# Patient Record
Sex: FEMALE | Race: OTHER | HISPANIC OR LATINO | URBAN - METROPOLITAN AREA
[De-identification: names, ages, dates, MRNs, and addresses within clinical notes are randomized per-mention and may not be internally consistent; named-entity substitution may affect disease eponyms.]

---

## 2019-10-15 VITALS
OXYGEN SATURATION: 99 % | RESPIRATION RATE: 18 BRPM | WEIGHT: 187.39 LBS | DIASTOLIC BLOOD PRESSURE: 78 MMHG | TEMPERATURE: 98 F | SYSTOLIC BLOOD PRESSURE: 163 MMHG | HEART RATE: 70 BPM

## 2019-10-15 NOTE — ED ADULT TRIAGE NOTE - OTHER COMPLAINTS
CXR shows lt pleural effusion / atelectasis with mild pulmonary venous congestion / mild cardiomegaly.

## 2019-10-16 ENCOUNTER — INPATIENT (INPATIENT)
Facility: HOSPITAL | Age: 84
LOS: 1 days | Discharge: HOME | DRG: 194 | End: 2019-10-18
Attending: INTERNAL MEDICINE | Admitting: INTERNAL MEDICINE
Payer: COMMERCIAL

## 2019-10-16 DIAGNOSIS — R79.89 OTHER SPECIFIED ABNORMAL FINDINGS OF BLOOD CHEMISTRY: ICD-10-CM

## 2019-10-16 DIAGNOSIS — D64.9 ANEMIA, UNSPECIFIED: ICD-10-CM

## 2019-10-16 DIAGNOSIS — J12.9 VIRAL PNEUMONIA, UNSPECIFIED: ICD-10-CM

## 2019-10-16 DIAGNOSIS — R63.8 OTHER SYMPTOMS AND SIGNS CONCERNING FOOD AND FLUID INTAKE: ICD-10-CM

## 2019-10-16 DIAGNOSIS — Z29.9 ENCOUNTER FOR PROPHYLACTIC MEASURES, UNSPECIFIED: ICD-10-CM

## 2019-10-16 DIAGNOSIS — I73.9 PERIPHERAL VASCULAR DISEASE, UNSPECIFIED: ICD-10-CM

## 2019-10-16 DIAGNOSIS — M10.9 GOUT, UNSPECIFIED: ICD-10-CM

## 2019-10-16 DIAGNOSIS — Z66 DO NOT RESUSCITATE: ICD-10-CM

## 2019-10-16 DIAGNOSIS — Z91.89 OTHER SPECIFIED PERSONAL RISK FACTORS, NOT ELSEWHERE CLASSIFIED: ICD-10-CM

## 2019-10-16 DIAGNOSIS — J90 PLEURAL EFFUSION, NOT ELSEWHERE CLASSIFIED: ICD-10-CM

## 2019-10-16 DIAGNOSIS — D50.9 IRON DEFICIENCY ANEMIA, UNSPECIFIED: ICD-10-CM

## 2019-10-16 DIAGNOSIS — I10 ESSENTIAL (PRIMARY) HYPERTENSION: ICD-10-CM

## 2019-10-16 LAB
ANION GAP SERPL CALC-SCNC: 11 MMOL/L — SIGNIFICANT CHANGE UP (ref 5–17)
BUN SERPL-MCNC: 55 MG/DL — HIGH (ref 7–23)
CALCIUM SERPL-MCNC: 10 MG/DL — SIGNIFICANT CHANGE UP (ref 8.4–10.5)
CHLORIDE SERPL-SCNC: 109 MMOL/L — HIGH (ref 96–108)
CO2 SERPL-SCNC: 18 MMOL/L — LOW (ref 22–31)
CREAT SERPL-MCNC: 1.35 MG/DL — HIGH (ref 0.5–1.3)
GLUCOSE SERPL-MCNC: 171 MG/DL — HIGH (ref 70–99)
LACTATE SERPL-SCNC: 0.8 MMOL/L — SIGNIFICANT CHANGE UP (ref 0.5–2)
MAGNESIUM SERPL-MCNC: 1.1 MG/DL — LOW (ref 1.6–2.6)
OSMOLALITY SERPL: 308 MOSMOL/KG — HIGH (ref 280–301)
PHOSPHATE SERPL-MCNC: 4.2 MG/DL — SIGNIFICANT CHANGE UP (ref 2.5–4.5)
POTASSIUM SERPL-MCNC: 5.6 MMOL/L — HIGH (ref 3.5–5.3)
POTASSIUM SERPL-SCNC: 5.6 MMOL/L — HIGH (ref 3.5–5.3)
PROCALCITONIN SERPL-MCNC: 0.2 NG/ML — HIGH (ref 0.02–0.1)
RAPID RVP RESULT: DETECTED
RV+EV RNA SPEC QL NAA+PROBE: DETECTED
SODIUM SERPL-SCNC: 138 MMOL/L — SIGNIFICANT CHANGE UP (ref 135–145)
TROPONIN T SERPL-MCNC: 0.02 NG/ML — HIGH (ref 0–0.01)
TROPONIN T SERPL-MCNC: 0.03 NG/ML — HIGH (ref 0–0.01)

## 2019-10-16 PROCEDURE — 93321 DOPPLER ECHO F-UP/LMTD STD: CPT | Mod: 26

## 2019-10-16 PROCEDURE — 71045 X-RAY EXAM CHEST 1 VIEW: CPT | Mod: 26

## 2019-10-16 PROCEDURE — 93010 ELECTROCARDIOGRAM REPORT: CPT

## 2019-10-16 PROCEDURE — 93308 TTE F-UP OR LMTD: CPT | Mod: 26

## 2019-10-16 PROCEDURE — 99223 1ST HOSP IP/OBS HIGH 75: CPT | Mod: GC

## 2019-10-16 PROCEDURE — 99053 MED SERV 10PM-8AM 24 HR FAC: CPT

## 2019-10-16 PROCEDURE — 73562 X-RAY EXAM OF KNEE 3: CPT | Mod: 26,50

## 2019-10-16 PROCEDURE — 99285 EMERGENCY DEPT VISIT HI MDM: CPT | Mod: 25

## 2019-10-16 RX ORDER — ATORVASTATIN CALCIUM 80 MG/1
1 TABLET, FILM COATED ORAL
Qty: 0 | Refills: 0 | DISCHARGE

## 2019-10-16 RX ORDER — NIMODIPINE 60 MG/10ML
30 SOLUTION ORAL EVERY 6 HOURS
Refills: 0 | Status: DISCONTINUED | OUTPATIENT
Start: 2019-10-16 | End: 2019-10-16

## 2019-10-16 RX ORDER — ASPIRIN/CALCIUM CARB/MAGNESIUM 324 MG
81 TABLET ORAL DAILY
Refills: 0 | Status: DISCONTINUED | OUTPATIENT
Start: 2019-10-16 | End: 2019-10-16

## 2019-10-16 RX ORDER — INFLUENZA VIRUS VACCINE 15; 15; 15; 15 UG/.5ML; UG/.5ML; UG/.5ML; UG/.5ML
0.5 SUSPENSION INTRAMUSCULAR ONCE
Refills: 0 | Status: DISCONTINUED | OUTPATIENT
Start: 2019-10-16 | End: 2019-10-18

## 2019-10-16 RX ORDER — IPRATROPIUM/ALBUTEROL SULFATE 18-103MCG
3 AEROSOL WITH ADAPTER (GRAM) INHALATION EVERY 6 HOURS
Refills: 0 | Status: DISCONTINUED | OUTPATIENT
Start: 2019-10-16 | End: 2019-10-18

## 2019-10-16 RX ORDER — PENTOXIFYLLINE 400 MG
400 TABLET, EXTENDED RELEASE ORAL DAILY
Refills: 0 | Status: DISCONTINUED | OUTPATIENT
Start: 2019-10-16 | End: 2019-10-16

## 2019-10-16 RX ORDER — ALLOPURINOL 300 MG
50 TABLET ORAL DAILY
Refills: 0 | Status: DISCONTINUED | OUTPATIENT
Start: 2019-10-16 | End: 2019-10-18

## 2019-10-16 RX ORDER — FUROSEMIDE 40 MG
40 TABLET ORAL ONCE
Refills: 0 | Status: COMPLETED | OUTPATIENT
Start: 2019-10-16 | End: 2019-10-16

## 2019-10-16 RX ORDER — IRON,CARBONYL 45 MG
1 TABLET ORAL
Qty: 0 | Refills: 0 | DISCHARGE

## 2019-10-16 RX ORDER — IPRATROPIUM/ALBUTEROL SULFATE 18-103MCG
3 AEROSOL WITH ADAPTER (GRAM) INHALATION ONCE
Refills: 0 | Status: COMPLETED | OUTPATIENT
Start: 2019-10-16 | End: 2019-10-16

## 2019-10-16 RX ORDER — ATORVASTATIN CALCIUM 80 MG/1
20 TABLET, FILM COATED ORAL AT BEDTIME
Refills: 0 | Status: DISCONTINUED | OUTPATIENT
Start: 2019-10-16 | End: 2019-10-18

## 2019-10-16 RX ORDER — ACETYLCYSTEINE 200 MG/ML
5 VIAL (ML) MISCELLANEOUS THREE TIMES A DAY
Refills: 0 | Status: COMPLETED | OUTPATIENT
Start: 2019-10-16 | End: 2019-10-17

## 2019-10-16 RX ORDER — POLYETHYLENE GLYCOL 3350 17 G/17G
17 POWDER, FOR SOLUTION ORAL DAILY
Refills: 0 | Status: DISCONTINUED | OUTPATIENT
Start: 2019-10-16 | End: 2019-10-18

## 2019-10-16 RX ORDER — LOSARTAN POTASSIUM 100 MG/1
50 TABLET, FILM COATED ORAL DAILY
Refills: 0 | Status: DISCONTINUED | OUTPATIENT
Start: 2019-10-16 | End: 2019-10-16

## 2019-10-16 RX ORDER — AZITHROMYCIN 500 MG/1
500 TABLET, FILM COATED ORAL ONCE
Refills: 0 | Status: COMPLETED | OUTPATIENT
Start: 2019-10-16 | End: 2019-10-16

## 2019-10-16 RX ORDER — SENNA PLUS 8.6 MG/1
2 TABLET ORAL AT BEDTIME
Refills: 0 | Status: DISCONTINUED | OUTPATIENT
Start: 2019-10-16 | End: 2019-10-18

## 2019-10-16 RX ORDER — CEFTRIAXONE 500 MG/1
1000 INJECTION, POWDER, FOR SOLUTION INTRAMUSCULAR; INTRAVENOUS ONCE
Refills: 0 | Status: COMPLETED | OUTPATIENT
Start: 2019-10-16 | End: 2019-10-16

## 2019-10-16 RX ORDER — PENTOXIFYLLINE 400 MG
1 TABLET, EXTENDED RELEASE ORAL
Qty: 0 | Refills: 0 | DISCHARGE

## 2019-10-16 RX ORDER — FOLIC ACID 0.8 MG
1 TABLET ORAL DAILY
Refills: 0 | Status: DISCONTINUED | OUTPATIENT
Start: 2019-10-16 | End: 2019-10-18

## 2019-10-16 RX ORDER — FERROUS SULFATE 325(65) MG
325 TABLET ORAL DAILY
Refills: 0 | Status: DISCONTINUED | OUTPATIENT
Start: 2019-10-16 | End: 2019-10-18

## 2019-10-16 RX ORDER — MAGNESIUM SULFATE 500 MG/ML
4 VIAL (ML) INJECTION ONCE
Refills: 0 | Status: COMPLETED | OUTPATIENT
Start: 2019-10-16 | End: 2019-10-16

## 2019-10-16 RX ORDER — ALLOPURINOL 300 MG
0.5 TABLET ORAL
Qty: 0 | Refills: 0 | DISCHARGE

## 2019-10-16 RX ORDER — NIMODIPINE 60 MG/10ML
1 SOLUTION ORAL
Qty: 0 | Refills: 0 | DISCHARGE

## 2019-10-16 RX ORDER — ENOXAPARIN SODIUM 100 MG/ML
30 INJECTION SUBCUTANEOUS EVERY 24 HOURS
Refills: 0 | Status: DISCONTINUED | OUTPATIENT
Start: 2019-10-16 | End: 2019-10-18

## 2019-10-16 RX ORDER — AMLODIPINE BESYLATE 2.5 MG/1
5 TABLET ORAL DAILY
Refills: 0 | Status: DISCONTINUED | OUTPATIENT
Start: 2019-10-16 | End: 2019-10-16

## 2019-10-16 RX ORDER — LOSARTAN POTASSIUM 100 MG/1
1 TABLET, FILM COATED ORAL
Qty: 0 | Refills: 0 | DISCHARGE

## 2019-10-16 RX ORDER — CITICOLINE SODIUM 500 MG
0.5 TABLET ORAL
Qty: 0 | Refills: 0 | DISCHARGE

## 2019-10-16 RX ORDER — LOSARTAN POTASSIUM 100 MG/1
50 TABLET, FILM COATED ORAL EVERY 12 HOURS
Refills: 0 | Status: DISCONTINUED | OUTPATIENT
Start: 2019-10-16 | End: 2019-10-16

## 2019-10-16 RX ORDER — FOLIC ACID 0.8 MG
1 TABLET ORAL
Qty: 0 | Refills: 0 | DISCHARGE

## 2019-10-16 RX ADMIN — Medication 40 MILLIGRAM(S): at 02:00

## 2019-10-16 RX ADMIN — LOSARTAN POTASSIUM 50 MILLIGRAM(S): 100 TABLET, FILM COATED ORAL at 10:51

## 2019-10-16 RX ADMIN — Medication 1 MILLIGRAM(S): at 13:56

## 2019-10-16 RX ADMIN — Medication 5 MILLILITER(S): at 13:56

## 2019-10-16 RX ADMIN — Medication 50 MILLIGRAM(S): at 14:00

## 2019-10-16 RX ADMIN — Medication 400 MILLIGRAM(S): at 13:56

## 2019-10-16 RX ADMIN — Medication 100 GRAM(S): at 10:50

## 2019-10-16 RX ADMIN — Medication 3 MILLILITER(S): at 03:23

## 2019-10-16 RX ADMIN — Medication 3 MILLILITER(S): at 21:50

## 2019-10-16 RX ADMIN — AMLODIPINE BESYLATE 5 MILLIGRAM(S): 2.5 TABLET ORAL at 18:06

## 2019-10-16 RX ADMIN — Medication 3 MILLILITER(S): at 17:59

## 2019-10-16 RX ADMIN — Medication 3 MILLILITER(S): at 10:50

## 2019-10-16 RX ADMIN — Medication 3 MILLILITER(S): at 03:01

## 2019-10-16 RX ADMIN — ATORVASTATIN CALCIUM 20 MILLIGRAM(S): 80 TABLET, FILM COATED ORAL at 21:51

## 2019-10-16 RX ADMIN — Medication 125 MILLIGRAM(S): at 03:17

## 2019-10-16 RX ADMIN — CEFTRIAXONE 100 MILLIGRAM(S): 500 INJECTION, POWDER, FOR SOLUTION INTRAMUSCULAR; INTRAVENOUS at 02:00

## 2019-10-16 RX ADMIN — Medication 325 MILLIGRAM(S): at 13:56

## 2019-10-16 RX ADMIN — Medication 5 MILLILITER(S): at 21:51

## 2019-10-16 RX ADMIN — ENOXAPARIN SODIUM 30 MILLIGRAM(S): 100 INJECTION SUBCUTANEOUS at 10:51

## 2019-10-16 RX ADMIN — POLYETHYLENE GLYCOL 3350 17 GRAM(S): 17 POWDER, FOR SOLUTION ORAL at 13:58

## 2019-10-16 RX ADMIN — AZITHROMYCIN 255 MILLIGRAM(S): 500 TABLET, FILM COATED ORAL at 02:01

## 2019-10-16 NOTE — ED PROVIDER NOTE - PHYSICAL EXAMINATION
GENERAL: NAD, lying in bed comfortably  HEAD: Atraumatic, Normocephalic  EYES: EOMI, PERRLA, conjunctiva and sclera clear  ENT: Moist mucous membranes  NECK: Supple, No JVD  CHEST/LUNG: Diffuse rhonchi, expiratory wheezing, diminished BS at bases, no hypoxia or tachypnea.  HEART: Regular rate and rhythm; No murmurs, rubs, or gallops  ABDOMEN: BSx4; Soft, nontender, obses  EXTREMITIES:  no deformities, warm and well-perfused, trace b/l LE edema.  NERVOUS SYSTEM:  A&Ox1 to person, right arm rigid on ROM, diminished strength in RUE and RLE from previous stroke. GENERAL: NAD, lying in bed comfortably  HEAD: Atraumatic, Normocephalic  EYES: EOMI, PERRLA, conjunctiva and sclera clear  ENT: Moist mucous membranes  NECK: Supple, No JVD  CHEST/LUNG: Diffuse rhonchi, expiratory wheezing, diminished BS at bases, no hypoxia or tachypnea.  HEART: Regular rate and rhythm; No murmurs, rubs, or gallops  ABDOMEN: BSx4; Soft, nontender, obese  EXTREMITIES:  no deformities, warm and well-perfused, trace b/l LE edema.  NERVOUS SYSTEM:  A&Ox1 to person, right arm rigid on ROM, diminished strength in RUE and RLE from previous stroke.

## 2019-10-16 NOTE — PROGRESS NOTE ADULT - SUBJECTIVE AND OBJECTIVE BOX
CC/ HPI Patient is a 89 year old female presents with dyspnea, cough and wheezing, admitted for viral pneumonia with moderate to large partially loculated left pleural effusion initially discovered on CT chest following evaluation of head trauma Sept 2019, seen this morning resting in bed without acute complaint.       PAST MEDICAL & SURGICAL HISTORY:  Peripheral vascular insufficiency  CVA (cerebrovascular accident)  HTN (hypertension)    SOCHX:  - tobacco,  -  alcohol    FMHX: FA/MO  - contributory     ROS reviewed below with positive findings marked (+) :  GEN:  fever, chills ENT: tracheostomy,   epistaxis,  sinusitis COR: CAD, CHF,  HTN, dysrhythmia PUL: COPD, ILD, asthma, pneumonia GI: PEG, dysphagia, hemorrhage, other ALEX: kidney disease, electrolyte disorder HEM:  anemia, thrombus, coagulopathy, cancer ENDO:  thyroid disease, diabetes mellitus CNS:  dementia, stroke, seizure, PSY:  depression, anxiety, other      MEDICATIONS  (STANDING):  acetylcysteine 10%  Inhalation 5 milliLiter(s) Inhalation three times a day  ALBUTerol/ipratropium for Nebulization 3 milliLiter(s) Nebulizer every 6 hours  allopurinol 50 milliGRAM(s) Oral daily  amLODIPine   Tablet 5 milliGRAM(s) Oral daily  atorvastatin 20 milliGRAM(s) Oral at bedtime  enoxaparin Injectable 30 milliGRAM(s) SubCutaneous every 24 hours  ferrous    sulfate 325 milliGRAM(s) Oral daily  folic acid 1 milliGRAM(s) Oral daily  influenza   Vaccine 0.5 milliLiter(s) IntraMuscular once  polyethylene glycol 3350 17 Gram(s) Oral daily  senna 2 Tablet(s) Oral at bedtime    MEDICATIONS  (PRN):      Vital Signs Last 24 Hrs  T(C): 37.1 (16 Oct 2019 16:50), Max: 37.1 (16 Oct 2019 16:50)  T(F): 98.8 (16 Oct 2019 16:50), Max: 98.8 (16 Oct 2019 16:50)  HR: 84 (16 Oct 2019 16:50) (67 - 84)  BP: 115/67 (16 Oct 2019 16:50) (115/67 - 163/78)  RR: 16 (16 Oct 2019 16:50) (16 - 20)  SpO2: 97% (16 Oct 2019 16:50) (96% - 99%)    GENERAL:         comfortable,  - distress.  HEENT:            - icterus,  - injection,  - nasal discharge.  NECK:              - jugular venous distention, - thyromegaly.  LYMPH:           - lymphadenopathy, - masses.  RESP:               + rhonchi, + wheezes,   - rales.   COR:                S1S2   - gallops,  - rubs.  ABD:                bowel sounds,   soft, - tender, - distended.  EXT/MSC:         - cyanosis,  - clubbing, - edema.    NEURO:             alert,   responds to stimuli.                          8.8    7.45  )-----------( 327      ( 16 Oct 2019 00:19 )             29.6     10-16    138  |  109<H>  |  55<H>  ----------------------------<  171<H>  5.6<H>   |  18<L>  |  1.35<H>        CXR (10/16/19)  Left consolidation with pleural effusion, right base opacity.        ASSESSMENT/PLAN    1)  Pneumonia  2)  Left loculated pleural effusion  3)  Bronchospasm    Oxygen as needed for a satisfactory SpO2  Recommend IR thoracentesis for diagnosis  Bronchodilators:  Atrovent/ albuterol q 4 – 6 hours as needed  Corticosteroids: status post solumedrol, consider prednisone  ID/Antibiotics: status post Zithromax/ceftriaxone  Cardiac/HTN: echocardiogram  GI: Rx/ prophylaxis c PPI/H2B  Heme: Rx/VT prophylaxis  Discussed at length with the medical team and the patient's family

## 2019-10-16 NOTE — H&P ADULT - PROBLEM SELECTOR PLAN 1
Pt presents with cough productive of white sputum and SOB x3 days with no associated fevers/chills, leukocytosis  - Rhonchi, expiratory wheezing on exam  - RVP positive for entero/rhinovirus  - CXR possible LLL consolidation vs pleural effusion  - s/p ceftriaxone/azithromycin x1 dose in ED  - Duonebs q6h  - f/u blood culture, UA Pt presents with cough productive of white sputum and SOB x3 days with no associated fevers/chills, leukocytosis. Rhonchi and expiratory wheezing present on exam. RVP positive for entero/rhinovirus. CXR possible LLL consolidation vs pleural effusion. Received ceftriaxone/azithromycin x1 in ED.  - Duonebs q6h  - Supportive care  - f/u blood culture, UA Pt presents with cough productive of white sputum and SOB x3 days with no associated fevers/chills, leukocytosis. Rhonchi and expiratory wheezing present on exam. RVP positive for entero/rhinovirus. CXR possible LLL consolidation vs pleural effusion. Received ceftriaxone/azithromycin x1 in ED.  - Duonebs q6h  - 10% inhaled mucomyst   - Supportive care  - f/u blood culture, UA

## 2019-10-16 NOTE — H&P ADULT - NSICDXPASTMEDICALHX_GEN_ALL_CORE_FT
PAST MEDICAL HISTORY:  CVA (cerebrovascular accident)     HTN (hypertension)     Peripheral vascular insufficiency

## 2019-10-16 NOTE — H&P ADULT - PROBLEM SELECTOR PLAN 6
F: None  E: Replete PRN for K<4, Mg<2  N: DASH/TLC diet On presentation BUN 52, Cr 1.49; likely pre-renal azotemia  - Baseline BUN/Cr unknown  - May be 2/2 dehydration vs CHF  - f/u urine lytes, FENa  - Monitor UOP  - Holding fluids for now in setting of possible volume overload On presentation BUN 52, Cr 1.49; likely pre-renal azotemia. Baseline BUN/Cr unknown. May be 2/2 dehydration vs CHF.  - f/u formal echo  - f/u urine lytes, FENa  - Monitor UOP  - Holding fluids for now in setting of possible volume overload

## 2019-10-16 NOTE — CHART NOTE - NSCHARTNOTEFT_GEN_A_CORE
Patient's HCP gave verbal consent for [facility] physicians to contact patient's primary care physician, Dr. Stewart at [phone] to discuss the patient's progress in the hospital and the risks/benefits of thoracentesis. Discussed with Dr. Acuña.

## 2019-10-16 NOTE — H&P ADULT - PROBLEM SELECTOR PLAN 7
DVT PPx:   GI PPx: None F: None  E: Replete PRN for K<4, Mg<2  N: DASH/TLC diet, folic acid Pt with non-palpable distal pulses  - Continue home pentoxifylline Pt with non-palpable distal pulses  - Continue home pentoxifylline  - obtain collateral from PCP regarding ongoing use of pentoxifylline, who prescribed it  - compression stockings

## 2019-10-16 NOTE — H&P ADULT - PROBLEM SELECTOR PLAN 2
Presentation as above  Per outpatient pulmonologist Dr. Acuña, pt has left-sided loculated pleural effusions  - Ordered IR-guided thoracentesis Presentation as above  - Per outpatient pulmonologist Dr. Acuña, pt has left-sided loculated pleural effusions  - Bedside echo: normal EF, mild MR  - Ordered IR-guided thoracentesis Presentation as above  - Per outpatient pulmonologist Dr. Acuña, pt has left-sided loculated pleural effusion  - Etiology unclear, BNP 33k on presentation  - Bedside echo: normal EF, mild MR  - f/u formal echo  - Ordered IR-guided thoracentesis Presentation as above. Pt has known left-sided loculated pleural effusion for several months per outpatient pulmonologist Dr. Acuña. Etiology unclear, BNP 33k on presentation, bedside echo with normal EF, mild MR. May be 2/2 CHF, infection, or malignancy.  - f/u formal echo  - Ordered IR-guided thoracentesis  - f/u Dr. Acuña rec's Presentation as above. Pt has known left-sided loculated pleural effusion for several months per outpatient pulmonologist Dr. Acuña. Etiology unclear, BNP 2.1k on presentation, bedside echo with normal EF, mild MR. May be 2/2 CHF, infection, or malignancy.  - f/u formal echo  - Ordered IR-guided thoracentesis  - f/u Dr. Acuña rec's

## 2019-10-16 NOTE — H&P ADULT - PROBLEM SELECTOR PROBLEM 7
Prophylactic measure Nutrition, metabolism, and development symptoms Peripheral vascular insufficiency

## 2019-10-16 NOTE — H&P ADULT - PROBLEM SELECTOR PROBLEM 9
Transition of care performed with sharing of clinical summary Prophylactic measure DNR (do not resuscitate)

## 2019-10-16 NOTE — H&P ADULT - PROBLEM SELECTOR PLAN 9
1) PCP Contacted on Admission: Dr. Galina Arthur [phone]  2) Date of Contact with PCP: 10/16 DVT PPx: Lovenox 30mg subQ q24h  GI PPx: None DNR/DNI discussed with pt's niece (healthcare proxy)  Per niece, pt has expressed that she would not want to receive compressions or be intubated; she wants to pass naturally DNR/DNI discussed with pt's niece (healthcare proxy)  Per niece, pt has expressed that she would not want to receive compressions or be intubated; she wants to pass naturally  - Will complete MOLST today DNR/DNI discussed with pt's niece (healthcare proxy), however, currently unclear what the parameters are on her desire for procedures etc.  Per niece, pt has expressed that she would not want to receive compressions or be intubated; she wants to pass naturally  - Will complete MOLST today  - contact niece for additional collateral specifically regarding whether want to undergo tap, and establish more specific goals of care. Currently not answering phone.

## 2019-10-16 NOTE — H&P ADULT - PROBLEM SELECTOR PLAN 4
- Hgb on presentation 8.8, baseline unknown  - Pt takes iron supplements at home Hgb on presentation 8.8. Outpatient PCP contacted, baseline unknown. Pt takes iron and folate supplements at home. No signs of acute bleeding, last colonoscopy date unknown.  - Continue iron, folate

## 2019-10-16 NOTE — ED ADULT NURSE NOTE - OBJECTIVE STATEMENT
89 y/o female c/o SOB. pt was sent from PMD for admission due to abnormal xray of chest. Pt has wet unproductive cough. pt is hard of hearing. Pt MAEx4, has unlabored breathing. Abd obese soft nt. Skin dry warm.

## 2019-10-16 NOTE — ED PROVIDER NOTE - CHPI ED SYMPTOMS POS
COUGH/x 3 days, with small amounts of while phlegm, abnormal breathing sounds, pain on movement or exertion of any kind, mild pedal edema, right sided weakness(s/p CVA 11years ago)

## 2019-10-16 NOTE — H&P ADULT - PROBLEM SELECTOR PLAN 3
Pt has history of HTN, takes losartan 50mg BID, nimodipine 30mg q6h Pt takes losartan 50mg BID, nimodipine 30mg q6h at home  - Continue home medications Pt takes losartan 50mg BID, nimodipine 30mg q6h at home. Blood pressure 140s/70s, pt not in sepsis.  - Continue home medications Pt takes losartan 50mg BID, nimodipine 30mg q6h at home. Blood pressure 140s/70s, pt not in sepsis.  - Continue home medications    #History of CVA (R sided deficits)  Wheelchair bound following CVA  -aspirin 81mg qday  -continue with home lipitor

## 2019-10-16 NOTE — ED PROVIDER NOTE - OBJECTIVE STATEMENT
Pt poor historian is non-english speaker and in mild distress, per caretaker niece pt is a 89 y/o F with PMHx of CVA s/p 11 years, and HTN(for which she is on medication). Pt over past 3 days has been coughing excessively and producing small amounts of white phlegm. Pt was at PCP earlier today; PCP expressed concern about pt's chest sounds, and breathing. Niece cites she spoke with pulmonologist Dwight Rodarte and he advised pt be sent as soon possible to ER for admission. S/P CVA 11years ago pt. has suffered from right sided weakness. Pt niece endorses pt has mild pedal edema, no chest pain, and pt unable to walk for past 1 year. Pt niece endorses pt not on inhalers of any kind, pt cannot move from lying position on her own and has intense pain on movement and exertion. Niece denies that pt was ever a smoker. Pt poor historian is non-english speaker and in mild distress, per caretaker niece pt is a 89 y/o F with PMHx of CVA s/p 11 years, and HTN(for which she is on medication). Pt over past 3 days has been coughing excessively and producing small amounts of white phlegm. Pt was at PCP earlier today; PCP expressed concern about pt's chest sounds, and breathing. Niece cites she spoke with pulmonologist Dwight Acuña and he advised pt be sent as soon possible to ER for admission. S/P CVA 11years ago pt. has suffered from right sided weakness. Pt niece endorses pt has mild pedal edema, no chest pain, and pt unable to walk for past 1 year. Pt niece endorses pt not on inhalers of any kind, pt cannot move from lying position on her own and has intense pain on movement and exertion. Niece denies that pt was ever a smoker.

## 2019-10-16 NOTE — CONSULT NOTE ADULT - SUBJECTIVE AND OBJECTIVE BOX
Orthopaedic Surgery Consult Note    HPI: 89 yo Female with PMH of CVA with residual R-sided deficits (wheelchair-bound x 1 year), HTN, and gout (on allopurinol), admitted for viral PNA, c/o pain and swelling to L knee x unknown duration of at least 2 weeks. Pt and her aide deny trauma to the area. Per aide, pt spends waking hours in wheelchair at baseline, and has not ambulated in one year. When asked where knee is most painful, pt points to anterior knee just medial to patella. She denies radiation. The pain is least severe when holding knee in 130 degrees flexion. At baseline, pt cannot fully extend L leg at knee.     For Surgeon:    Allergies: No Known Allergies    PAST MEDICAL & SURGICAL HISTORY:  Peripheral vascular insufficiency  CVA (cerebrovascular accident)  HTN (hypertension)    MEDICATIONS  (STANDING):  acetylcysteine 10%  Inhalation 5 milliLiter(s) Inhalation three times a day  ALBUTerol/ipratropium for Nebulization 3 milliLiter(s) Nebulizer every 6 hours  allopurinol 50 milliGRAM(s) Oral daily  enoxaparin Injectable 30 milliGRAM(s) SubCutaneous every 24 hours  ferrous    sulfate 325 milliGRAM(s) Oral daily  folic acid 1 milliGRAM(s) Oral daily  influenza   Vaccine 0.5 milliLiter(s) IntraMuscular once  losartan 50 milliGRAM(s) Oral every 12 hours  pentoxifylline 400 milliGRAM(s) Oral daily  polyethylene glycol 3350 17 Gram(s) Oral daily  senna 2 Tablet(s) Oral at bedtime    MEDICATIONS  (PRN):      Vital Signs Last 24 Hrs  T(C): 36.4 (16 Oct 2019 08:56), Max: 36.9 (15 Oct 2019 23:23)  T(F): 97.6 (16 Oct 2019 08:56), Max: 98.5 (15 Oct 2019 23:23)  HR: 69 (16 Oct 2019 08:56) (67 - 80)  BP: 144/75 (16 Oct 2019 08:56) (134/52 - 163/78)  BP(mean): --  RR: 19 (16 Oct 2019 08:56) (18 - 20)  SpO2: 99% (16 Oct 2019 08:56) (96% - 99%)    Physical Exam:  General: Elderly female lying in hospital bed, home health aide at bedside assisting with feeding, in NAD, appears drowsy but conversant. Albanian-speaking.   MSK:   L knee: No erythema to overlying skin. Skin intact. Moderate circumferential swelling to L knee, non-pitting. Non TTP. ROM limited to  degrees flexion. Warm and well-perfused distally. EHL/FHL/G/S/TA firing.  R knee: ROM limited to  degrees flexion, greater than contralateral knee. Warm and well-perfused distally. EHL/FHL/G/S/TA firing.    Imaging:     A/P: 89yo Female with PMH of gout and CVA (with residual R-sided deficits) admitted for viral PNA c/o pain, swelling, and limited ROM to L knee consistent     -Discussed with  Orthopaedic Surgery Consult Note    HPI: 89 yo Female with PMH of CVA with residual R-sided deficits (wheelchair-bound x 1 year), HTN, and gout (on allopurinol), admitted for viral PNA, c/o pain and swelling to Left knee x unknown duration of at least 2 weeks. Pt and her aide deny trauma to the area. Per aide, pt spends waking hours in wheelchair at baseline, and has not ambulated in one year. When asked where knee is most painful, pt points to anterior knee just medial to patella. She denies radiation. The pain is least severe when holding knee extended at 130 degrees . At baseline, pt cannot fully extend L leg at knee. Pt denies hx of gout flare to knee joints in the past. Denies fever/chills.     Allergies: No Known Allergies    PAST MEDICAL & SURGICAL HISTORY:  Peripheral vascular insufficiency  CVA (cerebrovascular accident)  HTN (hypertension)    MEDICATIONS  (STANDING):  acetylcysteine 10%  Inhalation 5 milliLiter(s) Inhalation three times a day  ALBUTerol/ipratropium for Nebulization 3 milliLiter(s) Nebulizer every 6 hours  allopurinol 50 milliGRAM(s) Oral daily  enoxaparin Injectable 30 milliGRAM(s) SubCutaneous every 24 hours  ferrous    sulfate 325 milliGRAM(s) Oral daily  folic acid 1 milliGRAM(s) Oral daily  influenza   Vaccine 0.5 milliLiter(s) IntraMuscular once  losartan 50 milliGRAM(s) Oral every 12 hours  pentoxifylline 400 milliGRAM(s) Oral daily  polyethylene glycol 3350 17 Gram(s) Oral daily  senna 2 Tablet(s) Oral at bedtime    MEDICATIONS  (PRN):      Vital Signs Last 24 Hrs  T(C): 36.4 (16 Oct 2019 08:56), Max: 36.9 (15 Oct 2019 23:23)  T(F): 97.6 (16 Oct 2019 08:56), Max: 98.5 (15 Oct 2019 23:23)  HR: 69 (16 Oct 2019 08:56) (67 - 80)  BP: 144/75 (16 Oct 2019 08:56) (134/52 - 163/78)  BP(mean): --  RR: 19 (16 Oct 2019 08:56) (18 - 20)  SpO2: 99% (16 Oct 2019 08:56) (96% - 99%)    Physical Exam:  General: Elderly female lying in hospital bed, home health aide at bedside assisting with feeding, in NAD, appears drowsy but conversant. Tajik-speaking.   MSK:   L knee: No erythema to overlying skin. Skin intact. Moderate circumferential/soft tissue swelling to L knee, non-pitting. Non tender to firm palpation. ROM limited to  degrees flexion. Warm and well-perfused distally. EHL/FHL/G/S/TA firing.   R knee: ROM limited to  degrees flexion, greater than contralateral knee. Warm and well-perfused distally. EHL/FHL/G/S/TA firing.    Imaging: Xrays of bilateral knees pending     A/P: 89yo Female with PMH of gout and CVA (with residual R-sided deficits) admitted for viral PNA c/o pain, swelling, and limited ROM to L knee - low clinical suspicion at this time for septic left knee as patient is afebrile, nontender to palpation, able to actively and passively range knee without pain    -Discussed with Dr. Vasquez   - Pain control as needed  - WBAT   - will give final recs pending bilateral knee XRs

## 2019-10-16 NOTE — H&P ADULT - PROBLEM SELECTOR PLAN 8
1) PCP Contacted on Admission: Dr. Galina Arthur [phone]  2) Date of Contact with PCP: 10/16 DVT PPx: Lovenox 30mg subQ q24h  GI PPx: None F: None  E: Replete PRN for K<4, Mg<2  N: DASH/TLC diet, folic acid F: None  E: Replete PRN for K<4, Mg<2  N: DASH/TLC diet, folic acid  DVT PPx: Lovenox 30mg subQ q24h  GI PPx: None  Dispo: [facility] F: None  E: Replete PRN for K<4, Mg<2  N: DASH/TLC diet, folic acid  DVT PPx: Lovenox 30mg subQ q24h for CrCl 15-30  GI PPx: None  Dispo: [facility] F: None  E: Replete PRN for K<4, Mg<2  N: DASH/TLC diet, folic acid, iron supplement  DVT PPx: Lovenox 30mg subQ q24h for CrCl 15-30  GI PPx: None  Dispo: [facility]

## 2019-10-16 NOTE — H&P ADULT - ASSESSMENT
Pt is a Australian-speaking 89F with PMH CVA 11 years ago (w/ residual right-sided deficits), HTN, gout who presents with cough and SOB x3 days, admitted for viral pneumonia.    Loculated pleural effusion          Anemia  - Hgb on presentation 8.8, baseline unknown  - Pt takes iron supplements at home Pt is a Indian-speaking 89F with PMH CVA 11 years ago (w/ residual right-sided deficits), HTN, gout who presents with cough and SOB x3 days, admitted for viral pneumonia.

## 2019-10-16 NOTE — H&P ADULT - NSHPSOCIALHISTORY_GEN_ALL_CORE
EtOH: denies  cigarettes: never smoker  Drugs: denies  HHA daily for approx 6 hours in the PM, daughter supplements additional care, gives patient her medications  Patient is wheelchair bound due to R sided weakness following CVA EtOH: denies  cigarettes: never smoker  Drugs: denies  HHA daily from 1pm-6pm, daughter/niece supplements additional care, gives patient her medications  Patient is wheelchair bound due to R sided weakness following CVA EtOH: denies  cigarettes: never smoker  Drugs: denies  HHA daily from 1pm-6pm, niece supplements additional care, gives patient her medications  Patient is wheelchair bound due to R sided weakness following CVA

## 2019-10-16 NOTE — ED PROVIDER NOTE - CLINICAL SUMMARY MEDICAL DECISION MAKING FREE TEXT BOX
89 y/o F with PMHx of CVA s/p 11 years, and HTN(for which she is on medication), over past 3 days has been coughing excessively and producing small amounts of white phlegm. Pt was at PCP earlier today; PCP expressed concern about pt's chest sounds and breathing. Pt niece (caregiver/historian) cites she spoke with pulmonologist Diwght Rodarte and he advised pt be sent as soon possible to ER for admission. Pt denies chest pain, has not walked for 1 year, has mild pedal edema, and can not move on own from lying position, movement and exertion of any kind painful. Pt will be admitted for pneumonia and new onset CHF evaluation. 89 y/o F with PMHx of CVA s/p 11 years, and HTN(for which she is on medication), over past 3 days has been coughing excessively and producing small amounts of white phlegm. Pt was at PCP earlier today; PCP expressed concern about pt's chest sounds and breathing. Pt niece (caregiver/historian) cites she spoke with pulmonologist Dwight Rodarte and he advised pt be sent as soon possible to ER for admission. Pt denies chest pain, has not walked for 1 year, has mild pedal edema, and can not move on own from lying position, movement and exertion of any kind painful.   Cards fellow consulted for possible new onset HF and came to bedside to perform echo which she reports shows normal EF. trop repeated and was stable. Duonebs and steroids were given for SOB in addition to abx for CAP. Pt admitted to [facility] for further mgmt of SOb and PNA.

## 2019-10-16 NOTE — H&P ADULT - NSHPLABSRESULTS_GEN_ALL_CORE
8.8    7.45  )-----------( 327      ( 16 Oct 2019 00:19 )             29.6       10-16    139  |  112<H>  |  52<H>  ----------------------------<  115<H>  5.4<H>   |  19<L>  |  1.49<H>    Ca    9.7      16 Oct 2019 00:19  Mg     1.2     10-16    TPro  7.5  /  Alb  3.1<L>  /  TBili  0.2  /  DBili  x   /  AST  13  /  ALT  13  /  AlkPhos  87  10-16          Lactate Trend  10-16 @ 02:16 Lactate:0.8       CARDIAC MARKERS ( 16 Oct 2019 04:05 )  x     / 0.03 ng/mL / x     / x     / x      CARDIAC MARKERS ( 16 Oct 2019 00:19 )  x     / 0.03 ng/mL / 67 U/L / x     / 2.0 ng/mL

## 2019-10-16 NOTE — H&P ADULT - HISTORY OF PRESENT ILLNESS
Cardiology Fellow did Bedside ECHO - Normal EF, Mild MR  F/u Dr Acuña         In the ED, vitals as follows: T: Afebrile | HR: 67-70bpm | BP: 136-163/74-78mmHg | RR: 18/min | SpO2: 98-99% RA    Labs significant for: No leukocytosis, Hgb 8.8 (Normocytic, Normal RDW), K+ 5.4, Bicarbonate 19, BUN 52, Cr 1.49, Mg 1.2, Trop 0.03 x2, BNP 2135, VBG [pH 7.27, CO2 44, Bicarv 20], RVP (+) Enterorhinovirus.   Imaging:  CXR:  Pulmonary Vascular Congestion w/ b/l pleural effusions. Can't exclude R sided infiltrate or assess for retrocardiac opacity.    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 Per pt's niece, who is her primary caretaker, over the weekend the patient developed a cough productive of white sputum with associated shortness of breath. No blood was noted in the sputum. Pt went to PCP, had a CXR which showed left pleural effusion with LLL atelectasis/consolidation, right basilar atelectasis +/- pleural effusion, and mild pulmonary venous congestion. Pt was then told to go to the ER for admission. At baseline, the patient is oriented only to person and place, requiring 24 hr care from her niece and home health aides (1pm-5pm 7 days a week) for all ADLs. She has not walked in the past year and uses a wheelchair. Otherwise pt denies any chest pain, nausea, vomiting, diarrhea, fevers, or chills. No recent travel, sick contacts, or changes in appetite.    In the ED, vitals as follows: T: Afebrile | HR: 67-70bpm | BP: 136-163/74-78mmHg | RR: 18/min | SpO2: 98-99% RA    Labs significant for: No leukocytosis, Hgb 8.8 (Normocytic, Normal RDW), K+ 5.4, Bicarbonate 19, BUN 52, Cr 1.49, Mg 1.2, Trop 0.03 x2, BNP 2135, VBG [pH 7.27, CO2 44, Bicarb 20], RVP (+) entero/rhinovirus.  Imaging:  CXR: Pulmonary Vascular Congestion w/ b/l pleural effusions. Can't exclude R sided infiltrate or assess for retrocardiac opacity.    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 Per pt's niece, who is her primary caretaker, over the weekend the patient developed a cough productive of white sputum with associated shortness of breath and wheezing. No blood was noted in the sputum. Pt went to PCP, had a CXR which showed left pleural effusion with LLL atelectasis/consolidation, right basilar atelectasis +/- pleural effusion, and mild pulmonary venous congestion. Pt was then told to go to the ER for admission. At baseline, the patient is oriented only to person and place, requiring 24 hr care from her niece and home health aides (1pm-5pm 7 days a week) for all ADLs. She has not walked in the past year and uses a wheelchair. Otherwise pt denies any chest pain, nausea, vomiting, diarrhea, fevers, or chills. No recent travel, sick contacts, or changes in appetite.    In the ED, vitals as follows: T: Afebrile | HR: 67-70bpm | BP: 136-163/74-78mmHg | RR: 18/min | SpO2: 98-99% RA    Labs significant for: No leukocytosis, Hgb 8.8 (Normocytic, Normal RDW), K+ 5.4, Bicarbonate 19, BUN 52, Cr 1.49, Mg 1.2, Trop 0.03 x2, BNP 2135, VBG [pH 7.27, CO2 44, Bicarb 20], RVP (+) entero/rhinovirus.  Imaging:  CXR: Pulmonary Vascular Congestion w/ b/l pleural effusions. Can't exclude R sided infiltrate or assess for retrocardiac opacity.    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 Per pt's niece, who is her primary caretaker, over the weekend the patient developed a cough productive of white sputum with associated shortness of breath and wheezing. No blood was noted in the sputum. Pt went to PCP, had a CXR which showed left pleural effusion with LLL atelectasis/consolidation, right basilar atelectasis +/- pleural effusion, and mild pulmonary venous congestion. Pt was then told to go to the ER for admission. At baseline, the patient is oriented only to person and place, requiring 24 hr care from her niece and home health aides (1pm-5pm 7 days a week) for all ADLs. She has not walked in the past year and uses a wheelchair. Otherwise pt denies any chest pain, nausea, vomiting, diarrhea, fevers, or chills. No recent travel, sick contacts, or changes in appetite.    In the ED, vitals as follows: T: Afebrile | HR: 67-70bpm | BP: 136-163/74-78mmHg | RR: 18/min | SpO2: 98-99% RA    Labs significant for: No leukocytosis, Hgb 8.8 (Normocytic, Normal RDW), K+ 5.4, Bicarbonate 19, BUN 52, Cr 1.49, Mg 1.2, Trop 0.03 x2, BNP 2135, VBG [pH 7.27, CO2 44, Bicarb 20], RVP (+) entero/rhinovirus.  Imaging:  CXR: Pulmonary Vascular Congestion w/ b/l pleural effusions. Can't exclude R sided infiltrate or assess for retrocardiac opacity.    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 Per pt's niece, who is her primary caretaker, over the weekend the patient developed a cough productive of white sputum with associated shortness of breath and wheezing. No blood was noted in the sputum. Pt went to PCP, had a CXR which showed left pleural effusion with LLL atelectasis/consolidation, right basilar atelectasis +/- pleural effusion, and mild pulmonary venous congestion. Pt was then told to go to the ER for admission. At baseline, the patient is oriented only to person and place, requiring 24 hr care from her daughter/niece and home health aides (1pm-6pm 7 days a week) for all ADLs. She has not walked in the past year and uses a wheelchair. Otherwise pt denies any chest pain, nausea, vomiting, diarrhea, fevers, or chills. No recent travel, sick contacts, or changes in appetite.    In the ED, vitals as follows: T: Afebrile | HR: 67-70bpm | BP: 136-163/74-78mmHg | RR: 18/min | SpO2: 98-99% RA    Labs significant for: No leukocytosis, Hgb 8.8 (Normocytic, Normal RDW), K+ 5.4, Bicarbonate 19, BUN 52, Cr 1.49, Mg 1.2, Trop 0.03 x2, BNP 2135, VBG [pH 7.27, CO2 44, Bicarb 20], RVP (+) entero/rhinovirus.  Imaging:  CXR: Pulmonary Vascular Congestion w/ b/l pleural effusions. Can't exclude R sided infiltrate or assess for retrocardiac opacity.    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 Per pt's niece, who is her primary caretaker, over the weekend the patient developed a cough productive of white sputum with associated shortness of breath and wheezing. No blood was noted in the sputum. Pt went to PCP, had a CXR which showed left pleural effusion with LLL atelectasis/consolidation, right basilar atelectasis +/- pleural effusion, and mild pulmonary venous congestion. Pt was then told to go to the ER for admission. At baseline, the patient is oriented only to person and place, requiring 24 hr care from her niece and home health aides (1pm-6pm 7 days a week) for all ADLs. She has not walked in the past year and uses a wheelchair. Otherwise pt denies any chest pain, nausea, vomiting, diarrhea, fevers, or chills. No recent travel, sick contacts, or changes in appetite.    In the ED, vitals as follows: T: Afebrile | HR: 67-70bpm | BP: 136-163/74-78mmHg | RR: 18/min | SpO2: 98-99% RA    Labs significant for: No leukocytosis, Hgb 8.8 (Normocytic, Normal RDW), K+ 5.4, Bicarbonate 19, BUN 52, Cr 1.49, Mg 1.2, Trop 0.03 x2, BNP 2135, VBG [pH 7.27, CO2 44, Bicarb 20], RVP (+) entero/rhinovirus.  Imaging:  CXR: Pulmonary Vascular Congestion w/ b/l pleural effusions. Can't exclude R sided infiltrate or assess for retrocardiac opacity.    Bedside ECHO performed by overnight cardiology fellow: Normal EF, Mild MR  EKG: NSR  Patient was administered: CTX/azithro, Solu-medrol 125mg, Lasix IV 40mg, Duonebs x2

## 2019-10-16 NOTE — H&P ADULT - NSHPPHYSICALEXAM_GEN_ALL_CORE
VITALS:   T(C): 36.3 (10-16-19 @ 07:36), Max: 36.9 (10-15-19 @ 23:23)  HR: 77 (10-16-19 @ 07:36) (67 - 80)  BP: 148/63 (10-16-19 @ 07:36) (134/52 - 163/78)  RR: 20 (10-16-19 @ 07:36) (18 - 20)  SpO2: 96% (10-16-19 @ 05:39) (96% - 99%)    GENERAL: NAD, lying in bed comfortably  HEAD: Atraumatic, Normocephalic  EYES: EOMI, PERRLA, conjunctiva and sclera clear  ENT: Moist mucous membranes  NECK: Supple, No JVD  CHEST/LUNG: Diffuse rhonchi, expiratory wheezing  HEART: Regular rate and rhythm; No murmurs, rubs, or gallops  ABDOMEN: BSx4; Soft, nontender, distended  EXTREMITIES:  2+ radial pulses, b/l DP/PT pulses not palpable but warm extremities; 1+ b/l LE edema, no clubbing, cyanosis  NERVOUS SYSTEM:  A&Ox1 to person, no focal deficits VITALS:   T(C): 36.3 (10-16-19 @ 07:36), Max: 36.9 (10-15-19 @ 23:23)  HR: 77 (10-16-19 @ 07:36) (67 - 80)  BP: 148/63 (10-16-19 @ 07:36) (134/52 - 163/78)  RR: 20 (10-16-19 @ 07:36) (18 - 20)  SpO2: 96% (10-16-19 @ 05:39) (96% - 99%)    GENERAL: NAD, lying in bed comfortably  HEAD: Atraumatic, Normocephalic  EYES: EOMI, PERRLA, conjunctiva and sclera clear  ENT: Moist mucous membranes  NECK: Supple, No JVD  CHEST/LUNG: Diffuse rhonchi, expiratory wheezing  HEART: Regular rate and rhythm; No murmurs, rubs, or gallops  ABDOMEN: BSx4; Soft, nontender, distended  EXTREMITIES:  2+ radial pulses, b/l DP/PT pulses not palpable but warm extremities; 1+ b/l LE edema, no clubbing, cyanosis  NERVOUS SYSTEM:  A&Ox1 to person, right arm rigid on ROM, unable to assess strength because pt not following commands consistently

## 2019-10-16 NOTE — GOALS OF CARE CONVERSATION - ADVANCED CARE PLANNING - CONVERSATION DETAILS
Pt's niece states that the patient has already filled out a healthcare proxy form stating that she is the official healthcare proxy. On interview, niece states that pt is from [facility] and has no other living family. Per patient's wishes, she had discussed that she would like to be DNR, DNI. Niece is at bedside and states that she is the primary caregiver in addition to a home health aide that provides care for 5 hrs/day 7 days a week.

## 2019-10-16 NOTE — ED PROVIDER NOTE - CARE PLAN
Principal Discharge DX:	Pneumonia Principal Discharge DX:	Pneumonia  Secondary Diagnosis:	Shortness of breath

## 2019-10-17 LAB
ANION GAP SERPL CALC-SCNC: 11 MMOL/L — SIGNIFICANT CHANGE UP (ref 5–17)
ANION GAP SERPL CALC-SCNC: 9 MMOL/L — SIGNIFICANT CHANGE UP (ref 5–17)
ANION GAP SERPL CALC-SCNC: 9 MMOL/L — SIGNIFICANT CHANGE UP (ref 5–17)
BUN SERPL-MCNC: 71 MG/DL — HIGH (ref 7–23)
BUN SERPL-MCNC: 72 MG/DL — HIGH (ref 7–23)
BUN SERPL-MCNC: 73 MG/DL — HIGH (ref 7–23)
CALCIUM SERPL-MCNC: 9.2 MG/DL — SIGNIFICANT CHANGE UP (ref 8.4–10.5)
CALCIUM SERPL-MCNC: 9.3 MG/DL — SIGNIFICANT CHANGE UP (ref 8.4–10.5)
CALCIUM SERPL-MCNC: 9.5 MG/DL — SIGNIFICANT CHANGE UP (ref 8.4–10.5)
CHLORIDE SERPL-SCNC: 111 MMOL/L — HIGH (ref 96–108)
CO2 SERPL-SCNC: 18 MMOL/L — LOW (ref 22–31)
CO2 SERPL-SCNC: 19 MMOL/L — LOW (ref 22–31)
CO2 SERPL-SCNC: 19 MMOL/L — LOW (ref 22–31)
CREAT SERPL-MCNC: 1.65 MG/DL — HIGH (ref 0.5–1.3)
CREAT SERPL-MCNC: 1.67 MG/DL — HIGH (ref 0.5–1.3)
CREAT SERPL-MCNC: 1.69 MG/DL — HIGH (ref 0.5–1.3)
GLUCOSE SERPL-MCNC: 101 MG/DL — HIGH (ref 70–99)
GLUCOSE SERPL-MCNC: 108 MG/DL — HIGH (ref 70–99)
GLUCOSE SERPL-MCNC: 111 MG/DL — HIGH (ref 70–99)
HCT VFR BLD CALC: 26.7 % — LOW (ref 34.5–45)
HCT VFR BLD CALC: 27.1 % — LOW (ref 34.5–45)
HGB BLD-MCNC: 7.8 G/DL — LOW (ref 11.5–15.5)
HGB BLD-MCNC: 8 G/DL — LOW (ref 11.5–15.5)
INR BLD: 1.09 — SIGNIFICANT CHANGE UP (ref 0.88–1.16)
MAGNESIUM SERPL-MCNC: 2.3 MG/DL — SIGNIFICANT CHANGE UP (ref 1.6–2.6)
MCHC RBC-ENTMCNC: 29 PG — SIGNIFICANT CHANGE UP (ref 27–34)
MCHC RBC-ENTMCNC: 29.2 GM/DL — LOW (ref 32–36)
MCHC RBC-ENTMCNC: 29.5 GM/DL — LOW (ref 32–36)
MCHC RBC-ENTMCNC: 29.6 PG — SIGNIFICANT CHANGE UP (ref 27–34)
MCV RBC AUTO: 100.4 FL — HIGH (ref 80–100)
MCV RBC AUTO: 99.3 FL — SIGNIFICANT CHANGE UP (ref 80–100)
NRBC # BLD: 0 /100 WBCS — SIGNIFICANT CHANGE UP (ref 0–0)
NRBC # BLD: 0 /100 WBCS — SIGNIFICANT CHANGE UP (ref 0–0)
PLATELET # BLD AUTO: 305 K/UL — SIGNIFICANT CHANGE UP (ref 150–400)
PLATELET # BLD AUTO: 315 K/UL — SIGNIFICANT CHANGE UP (ref 150–400)
POTASSIUM SERPL-MCNC: 5.7 MMOL/L — HIGH (ref 3.5–5.3)
POTASSIUM SERPL-MCNC: 5.8 MMOL/L — HIGH (ref 3.5–5.3)
POTASSIUM SERPL-MCNC: 5.9 MMOL/L — HIGH (ref 3.5–5.3)
POTASSIUM SERPL-SCNC: 5.7 MMOL/L — HIGH (ref 3.5–5.3)
POTASSIUM SERPL-SCNC: 5.8 MMOL/L — HIGH (ref 3.5–5.3)
POTASSIUM SERPL-SCNC: 5.9 MMOL/L — HIGH (ref 3.5–5.3)
PROTHROM AB SERPL-ACNC: 12.3 SEC — SIGNIFICANT CHANGE UP (ref 10–12.9)
RBC # BLD: 2.69 M/UL — LOW (ref 3.8–5.2)
RBC # BLD: 2.7 M/UL — LOW (ref 3.8–5.2)
RBC # FLD: 13.7 % — SIGNIFICANT CHANGE UP (ref 10.3–14.5)
RBC # FLD: 14 % — SIGNIFICANT CHANGE UP (ref 10.3–14.5)
SODIUM SERPL-SCNC: 139 MMOL/L — SIGNIFICANT CHANGE UP (ref 135–145)
SODIUM SERPL-SCNC: 139 MMOL/L — SIGNIFICANT CHANGE UP (ref 135–145)
SODIUM SERPL-SCNC: 140 MMOL/L — SIGNIFICANT CHANGE UP (ref 135–145)
WBC # BLD: 8.5 K/UL — SIGNIFICANT CHANGE UP (ref 3.8–10.5)
WBC # BLD: 9.62 K/UL — SIGNIFICANT CHANGE UP (ref 3.8–10.5)
WBC # FLD AUTO: 8.5 K/UL — SIGNIFICANT CHANGE UP (ref 3.8–10.5)
WBC # FLD AUTO: 9.62 K/UL — SIGNIFICANT CHANGE UP (ref 3.8–10.5)

## 2019-10-17 PROCEDURE — 71045 X-RAY EXAM CHEST 1 VIEW: CPT | Mod: 26

## 2019-10-17 PROCEDURE — 93010 ELECTROCARDIOGRAM REPORT: CPT

## 2019-10-17 PROCEDURE — 93010 ELECTROCARDIOGRAM REPORT: CPT | Mod: 77

## 2019-10-17 PROCEDURE — 99233 SBSQ HOSP IP/OBS HIGH 50: CPT | Mod: GC

## 2019-10-17 RX ORDER — INSULIN HUMAN 100 [IU]/ML
10 INJECTION, SOLUTION SUBCUTANEOUS ONCE
Refills: 0 | Status: COMPLETED | OUTPATIENT
Start: 2019-10-17 | End: 2019-10-17

## 2019-10-17 RX ORDER — CALCIUM GLUCONATE 100 MG/ML
1 VIAL (ML) INTRAVENOUS ONCE
Refills: 0 | Status: COMPLETED | OUTPATIENT
Start: 2019-10-17 | End: 2019-10-17

## 2019-10-17 RX ORDER — DEXTROSE 50 % IN WATER 50 %
50 SYRINGE (ML) INTRAVENOUS ONCE
Refills: 0 | Status: COMPLETED | OUTPATIENT
Start: 2019-10-17 | End: 2019-10-17

## 2019-10-17 RX ORDER — SODIUM CHLORIDE 9 MG/ML
500 INJECTION INTRAMUSCULAR; INTRAVENOUS; SUBCUTANEOUS ONCE
Refills: 0 | Status: COMPLETED | OUTPATIENT
Start: 2019-10-17 | End: 2019-10-17

## 2019-10-17 RX ORDER — SODIUM POLYSTYRENE SULFONATE 4.1 MEQ/G
15 POWDER, FOR SUSPENSION ORAL ONCE
Refills: 0 | Status: COMPLETED | OUTPATIENT
Start: 2019-10-17 | End: 2019-10-17

## 2019-10-17 RX ORDER — INSULIN HUMAN 100 [IU]/ML
6 INJECTION, SOLUTION SUBCUTANEOUS ONCE
Refills: 0 | Status: COMPLETED | OUTPATIENT
Start: 2019-10-17 | End: 2019-10-17

## 2019-10-17 RX ORDER — ASPIRIN/CALCIUM CARB/MAGNESIUM 324 MG
81 TABLET ORAL DAILY
Refills: 0 | Status: DISCONTINUED | OUTPATIENT
Start: 2019-10-17 | End: 2019-10-18

## 2019-10-17 RX ADMIN — Medication 3 MILLILITER(S): at 22:52

## 2019-10-17 RX ADMIN — Medication 3 MILLILITER(S): at 16:01

## 2019-10-17 RX ADMIN — ATORVASTATIN CALCIUM 20 MILLIGRAM(S): 80 TABLET, FILM COATED ORAL at 22:52

## 2019-10-17 RX ADMIN — Medication 50 MILLILITER(S): at 10:28

## 2019-10-17 RX ADMIN — Medication 200 GRAM(S): at 11:58

## 2019-10-17 RX ADMIN — SENNA PLUS 2 TABLET(S): 8.6 TABLET ORAL at 22:52

## 2019-10-17 RX ADMIN — Medication 5 MILLILITER(S): at 06:40

## 2019-10-17 RX ADMIN — Medication 50 MILLIGRAM(S): at 12:00

## 2019-10-17 RX ADMIN — INSULIN HUMAN 6 UNIT(S): 100 INJECTION, SOLUTION SUBCUTANEOUS at 19:01

## 2019-10-17 RX ADMIN — Medication 50 MILLILITER(S): at 19:00

## 2019-10-17 RX ADMIN — INSULIN HUMAN 10 UNIT(S): 100 INJECTION, SOLUTION SUBCUTANEOUS at 10:37

## 2019-10-17 RX ADMIN — SODIUM CHLORIDE 500 MILLILITER(S): 9 INJECTION INTRAMUSCULAR; INTRAVENOUS; SUBCUTANEOUS at 15:58

## 2019-10-17 RX ADMIN — Medication 325 MILLIGRAM(S): at 11:59

## 2019-10-17 RX ADMIN — ENOXAPARIN SODIUM 30 MILLIGRAM(S): 100 INJECTION SUBCUTANEOUS at 12:24

## 2019-10-17 RX ADMIN — Medication 3 MILLILITER(S): at 10:28

## 2019-10-17 RX ADMIN — Medication 1 MILLIGRAM(S): at 11:59

## 2019-10-17 RX ADMIN — SODIUM POLYSTYRENE SULFONATE 15 GRAM(S): 4.1 POWDER, FOR SUSPENSION ORAL at 19:01

## 2019-10-17 RX ADMIN — POLYETHYLENE GLYCOL 3350 17 GRAM(S): 17 POWDER, FOR SOLUTION ORAL at 12:00

## 2019-10-17 NOTE — PROGRESS NOTE ADULT - PROBLEM SELECTOR PLAN 8
F: None  E: Replete PRN for K<4, Mg<2  N: DASH/TLC diet, folic acid, iron supplement  DVT PPx: Lovenox 30mg subQ q24h for CrCl 15-30  GI PPx: None  Dispo: [facility]

## 2019-10-17 NOTE — DISCHARGE NOTE PROVIDER - PROVIDER TOKENS
PROVIDER:[TOKEN:[[id number]]] PROVIDER:[TOKEN:[[id number]]],PROVIDER:[TOKEN:[[id number]]]

## 2019-10-17 NOTE — DISCHARGE NOTE PROVIDER - CARE PROVIDER_API CALL
Dwight Acuña)  Critical Care Medicine; Pulmonary Disease  [address] [place] [address]  Phone: [phone]  Fax: [phone]  Follow Up Time: Dwight Acuña (MD)  Critical Care Medicine; Pulmonary Disease  [address]  [place] [address]  Phone: [phone]  Fax: [phone]  Follow Up Time:     Galina Arthur (DO)  Internal Medicine  [address], [address]  [place] [address]  Phone: [phone]  Fax: [phone]  Follow Up Time:

## 2019-10-17 NOTE — PROGRESS NOTE ADULT - PROBLEM SELECTOR PLAN 5
History of gout on allopurinol 50mg PO daily. Currently with swollen left knee, warm. Although not traditional area for true gout, may be due to gout flare.   - Hold allopurinol for now in setting of new onset knee pain, possible gout flare  - consult orthopaedics service for evaluate, may require tap  - xray left knee for r/o osseous abnormality

## 2019-10-17 NOTE — PROGRESS NOTE ADULT - PROBLEM SELECTOR PLAN 1
Pt presents with cough productive of white sputum and SOB x3 days with no associated fevers/chills, leukocytosis. Rhonchi and expiratory wheezing present on exam. RVP positive for entero/rhinovirus. CXR possible LLL consolidation vs pleural effusion. Received ceftriaxone/azithromycin x1 in ED.  - Duonebs q6h  - 10% inhaled mucomyst   - Supportive care  - f/u blood culture, UA

## 2019-10-17 NOTE — PROGRESS NOTE ADULT - ASSESSMENT
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

## 2019-10-17 NOTE — PROGRESS NOTE ADULT - PROBLEM SELECTOR PLAN 3
Pt takes losartan 50mg BID, nimodipine 30mg q6h at home. Blood pressure 140s/70s, pt not in sepsis.  - Continue home medications    #History of CVA (R sided deficits)  Wheelchair bound following CVA  -aspirin 81mg qday  -continue with home lipitor Pt takes losartan 50mg BID, nimodipine 30mg q6h at home.   - Continue home medications    #History of CVA (R sided deficits)  Wheelchair bound following CVA  -aspirin 81mg qday  -continue with home lipitor

## 2019-10-17 NOTE — PROGRESS NOTE ADULT - SUBJECTIVE AND OBJECTIVE BOX
INTERVAL HPI/OVERNIGHT EVENTS: As per nurse and patient, no o/n events.    SUBJECTIVE: Patient was seen and examined at bedside.  No complaints at this time. Patient denies: fever, chills, dizziness, weakness, HA, Changes in vision, CP, palpitations, SOB, cough, N/V/D/C, dysuria, changes in bowel movements, LE edema. ROS otherwise negative.    VITAL SIGNS:  T(F): 98 (10-17-19 @ 09:19)  HR: 76 (10-17-19 @ 09:19)  BP: 117/66 (10-17-19 @ 09:19)  RR: 17 (10-17-19 @ 09:19)  SpO2: 97% (10-17-19 @ 09:19)  Wt(kg): --    PHYSICAL EXAM:    Constitutional: WDWN, NAD  HEENT: PERRL, EOMI, sclera non-icteric, neck supple, trachea midline, no masses, no JVD, MMM, good dentition  Respiratory: CTA b/l, good air entry b/l, no wheezing, no rhonchi, no rales, without accessory muscle use and no intercostal retractions  Cardiovascular: RRR, normal S1S2, no M/R/G  Gastrointestinal: soft, NTND, no masses palpable, BS normal  Extremities: Warm, well perfused, pulses equal bilateral upper and lower extremities, no edema, no clubbing. Capillary refill <2 sec  Neurological: AAOx3, CN Grossly intact  Skin: Normal temperature, warm, dry    MEDICATIONS  (STANDING):  ALBUTerol/ipratropium for Nebulization 3 milliLiter(s) Nebulizer every 6 hours  allopurinol 50 milliGRAM(s) Oral daily  atorvastatin 20 milliGRAM(s) Oral at bedtime  enoxaparin Injectable 30 milliGRAM(s) SubCutaneous every 24 hours  ferrous    sulfate 325 milliGRAM(s) Oral daily  folic acid 1 milliGRAM(s) Oral daily  influenza   Vaccine 0.5 milliLiter(s) IntraMuscular once  polyethylene glycol 3350 17 Gram(s) Oral daily  senna 2 Tablet(s) Oral at bedtime    MEDICATIONS  (PRN):      Allergies    No Known Allergies    Intolerances        LABS:                        7.8    9.62  )-----------( 305      ( 17 Oct 2019 06:25 )             26.7     10-17    140  |  111<H>  |  71<H>  ----------------------------<  108<H>  5.7<H>   |  18<L>  |  1.69<H>    Ca    9.3      17 Oct 2019 06:25  Phos  4.2     10-16  Mg     2.3     10-17    TPro  7.5  /  Alb  3.1<L>  /  TBili  0.2  /  DBili  x   /  AST  13  /  ALT  13  /  AlkPhos  87  10-16    PT/INR - ( 17 Oct 2019 06:25 )   PT: 12.3 sec;   INR: 1.09                RADIOLOGY & ADDITIONAL TESTS:  Reviewed INTERVAL HPI/OVERNIGHT EVENTS: As per nurse and patient, no o/n events. Sammarinese speaking patient, used  video (#[id number]), potassium was elevated, gave Calcium, Insulin, D50.    SUBJECTIVE: Patient was seen and examined at bedside.  No complaints at this time. Patient denies: fever, chills, dizziness, weakness, HA, Changes in vision, CP, palpitations, SOB, cough, N/V/D/C, dysuria, changes in bowel movements, LE edema. ROS otherwise negative.    VITAL SIGNS:  T(F): 98 (10-17-19 @ 09:19)  HR: 76 (10-17-19 @ 09:19)  BP: 117/66 (10-17-19 @ 09:19)  RR: 17 (10-17-19 @ 09:19)  SpO2: 97% (10-17-19 @ 09:19)  Wt(kg): --    PHYSICAL EXAM:    GENERAL: NAD, lying in bed comfortably  HEAD: Atraumatic, Normocephalic  EYES: EOMI, PERRLA, conjunctiva and sclera clear  ENT: Moist mucous membranes  NECK: Supple, No JVD  CHEST/LUNG: Diffuse rhonchi, expiratory wheezing  HEART: Regular rate and rhythm; No murmurs, rubs, or gallops  ABDOMEN: BSx4; Soft, nontender, distended  EXTREMITIES:  2+ radial pulses, b/l DP/PT pulses not palpable but warm extremities; 1+ b/l LE edema, no clubbing, cyanosis  NERVOUS SYSTEM:  A&Ox1 to person, right arm rigid on ROM, unable to assess strength because pt not following commands consistently    MEDICATIONS  (STANDING):  ALBUTerol/ipratropium for Nebulization 3 milliLiter(s) Nebulizer every 6 hours  allopurinol 50 milliGRAM(s) Oral daily  atorvastatin 20 milliGRAM(s) Oral at bedtime  enoxaparin Injectable 30 milliGRAM(s) SubCutaneous every 24 hours  ferrous    sulfate 325 milliGRAM(s) Oral daily  folic acid 1 milliGRAM(s) Oral daily  influenza   Vaccine 0.5 milliLiter(s) IntraMuscular once  polyethylene glycol 3350 17 Gram(s) Oral daily  senna 2 Tablet(s) Oral at bedtime    MEDICATIONS  (PRN):      Allergies    No Known Allergies    Intolerances        LABS:                        7.8    9.62  )-----------( 305      ( 17 Oct 2019 06:25 )             26.7     10-17    140  |  111<H>  |  71<H>  ----------------------------<  108<H>  5.7<H>   |  18<L>  |  1.69<H>    Ca    9.3      17 Oct 2019 06:25  Phos  4.2     10-16  Mg     2.3     10-17    TPro  7.5  /  Alb  3.1<L>  /  TBili  0.2  /  DBili  x   /  AST  13  /  ALT  13  /  AlkPhos  87  10-16    PT/INR - ( 17 Oct 2019 06:25 )   PT: 12.3 sec;   INR: 1.09                RADIOLOGY & ADDITIONAL TESTS:  Reviewed

## 2019-10-17 NOTE — PROGRESS NOTE ADULT - PROBLEM SELECTOR PLAN 4
Hgb on presentation 8.8. Outpatient PCP contacted, baseline unknown. Pt takes iron and folate supplements at home. No signs of acute bleeding, last colonoscopy date unknown.  - Continue iron, folate

## 2019-10-17 NOTE — PROGRESS NOTE ADULT - PROBLEM SELECTOR PLAN 2
Presentation as above. Pt has known left-sided loculated pleural effusion for several months per outpatient pulmonologist Dr. Acuña. Etiology unclear, BNP 2.1k on presentation, bedside echo with normal EF, mild MR. May be 2/2 CHF, infection, or malignancy.  - f/u formal echo  - Ordered IR-guided thoracentesis  - f/u Dr. Acuña rec's

## 2019-10-17 NOTE — PROGRESS NOTE ADULT - PROBLEM SELECTOR PLAN 9
DNR/DNI discussed with pt's niece (healthcare proxy), however, currently unclear what the parameters are on her desire for procedures etc.  Per niece, pt has expressed that she would not want to receive compressions or be intubated; she wants to pass naturally  - Will complete MOLST today  - contact niece for additional collateral specifically regarding whether want to undergo tap, and establish more specific goals of care. Currently not answering phone.

## 2019-10-17 NOTE — PROGRESS NOTE ADULT - PROBLEM SELECTOR PLAN 6
On presentation BUN 52, Cr 1.49; likely pre-renal azotemia. Baseline BUN/Cr unknown. May be 2/2 dehydration vs CHF.  - f/u formal echo  - f/u urine lytes, FENa  - Monitor UOP  - Holding fluids for now in setting of possible volume overload

## 2019-10-17 NOTE — PROGRESS NOTE ADULT - SUBJECTIVE AND OBJECTIVE BOX
Ortho Progress Note    No acute events overnight.    Vital Signs Last 24 Hrs  T(C): 36.6 (17 Oct 2019 04:58), Max: 37.1 (16 Oct 2019 16:50)  T(F): 97.9 (17 Oct 2019 04:58), Max: 98.8 (16 Oct 2019 16:50)  HR: 75 (17 Oct 2019 04:58) (75 - 88)  BP: 109/62 (17 Oct 2019 04:58) (108/66 - 115/67)  BP(mean): --  RR: 16 (17 Oct 2019 04:58) (16 - 18)  SpO2: 94% (17 Oct 2019 04:58) (94% - 97%)      Physical Exam:  General: Elderly female lying in hospital bed, home health aide at bedside assisting with feeding, in NAD, appears drowsy but conversant. Montserratian-speaking.   MSK:   L knee: No erythema to overlying skin. Skin intact. Moderate circumferential/soft tissue swelling to L knee, non-pitting. Non tender to firm palpation. ROM limited to  degrees flexion. Warm and well-perfused distally. EHL/FHL/G/S/TA firing.   R knee: ROM limited to  degrees flexion, greater than contralateral knee. Warm and well-perfused distally. EHL/FHL/G/S/TA firing.    Imaging: XR of bilateral knees show advanced degenerative OA    A/P: 89yo Female with PMH of gout and CVA (with residual R-sided deficits) admitted for viral PNA c/o pain, swelling, and limited ROM to L knee - low clinical suspicion at this time for septic left knee as patient is afebrile, nontender to palpation, able to actively and passively range knee without pain    - Pain control as needed  - WBAT   - Physical therapy  - f/u with Dr. Vasquez in office on discharge

## 2019-10-17 NOTE — DISCHARGE NOTE PROVIDER - NSDCCPCAREPLAN_GEN_ALL_CORE_FT
PRINCIPAL DISCHARGE DIAGNOSIS  Diagnosis: Pneumonia  Assessment and Plan of Treatment: You were found to have a viral pneumonia with RVP positive nasal swab.  Pneumonia is an infection in which the air sacs in the lungs become inflamed and can be filled with fluid or pus. Some symptoms of pneumonia are cough (dry or productive), shortness of breath, fever, and other signs of infection. Pneumonia can be life-threatening, particularly in infants, children, people over 65, and those with additional medical conditions. If pneumonia is caused by bacteria, then antibiotics can help treat it. For your viral pneumonia, we treated you with supoortive measures. Please seek medical attention if the symptoms you came in with do not improve or get worse.        SECONDARY DISCHARGE DIAGNOSES  Diagnosis: Hyperkalemia  Assessment and Plan of Treatment: You were found to have hyperkalemia, meaning high potassium. PRINCIPAL DISCHARGE DIAGNOSIS  Diagnosis: Pneumonia  Assessment and Plan of Treatment: You were found to have a viral pneumonia with RVP positive nasal swab.  Pneumonia is an infection in which the air sacs in the lungs become inflamed and can be filled with fluid or pus. Some symptoms of pneumonia are cough (dry or productive), shortness of breath, fever, and other signs of infection. Pneumonia can be life-threatening, particularly in infants, children, people over 65, and those with additional medical conditions. If pneumonia is caused by bacteria, then antibiotics can help treat it. For your viral pneumonia, we treated you with supoortive measures. Please seek medical attention if the symptoms you came in with do not improve or get worse. Please follow up with your PCP, Dr. Arthur ([phone]) and Dr. Acuña ([phone]) within the next week. Please contact their office and set up appointments.        SECONDARY DISCHARGE DIAGNOSES  Diagnosis: Hyperkalemia  Assessment and Plan of Treatment: You were found to have hyperkalemia, meaning high potassium. You were successfully treated with insulin, kayexalate, and calcium gluconate.  Please follow up with your PCP to further address and monitor this condition.

## 2019-10-17 NOTE — PROGRESS NOTE ADULT - PROBLEM SELECTOR PLAN 7
Pt with non-palpable distal pulses  - Continue home pentoxifylline  - obtain collateral from PCP regarding ongoing use of pentoxifylline, who prescribed it  - compression stockings

## 2019-10-17 NOTE — DISCHARGE NOTE PROVIDER - HOSPITAL COURSE
Patient is 89yo F with past medical history of CVA, HTN, Gout.        Presented with [facility] after outpatient visit with pulmonologist and receiving, chest xray which showed persisitence of a loculated pleural effusion (had a CT a month prior which demonstrated the pleural effusion and refused tap).        Problem List/Main Diagnoses (system-based):         #Pulmonary    - loculated effusion: Improved during hospital stay    - refused tap        #VIRY        # Hyperkalemia            New medications:     Labs to be followed outpatient:     Exam to be followed outpatient: Patient is 89yo F with past medical history of CVA, HTN, Gout.        Presented with [facility] after outpatient visit with pulmonologist and receiving, chest xray which showed persisitence of a loculated pleural effusion (had a CT a month prior which demonstrated the pleural effusion and refused tap).        Problem List/Main Diagnoses (system-based):         #Pulmonary    - loculated effusion: Improved as compared to CT from prior hospitalization from a month prior. Effusion is stable.     - Patient refused thoracentesis, as effusion less likely due to an infectious etiology and therefore decision was made by patient health proxy (Blanca vo) that risks out way benefits since patients would not be interested in pursuing any additional treatments if effusion was malignant in nature.         #VIRY    - Resolved        # Hyperkalemia:     - patient required multiple hyperkalemia cocktails    - resolved and should follow up with PCP for further management             New medications:     Labs to be followed outpatient: Follow up a BMP with PCP    Exam to be followed outpatient: Follow up with a Chest xray with Dr. Acuña to monitor the effusion Patient is 89yo F with past medical history of CVA, HTN, Gout.        Presented with [facility] after outpatient visit with pulmonologist and receiving, chest xray which showed persisitence of a loculated pleural effusion (had a CT a month prior which demonstrated the pleural effusion and refused tap).        Problem List/Main Diagnoses (system-based):         #Pulmonary    - loculated effusion: Improved as compared to CT from prior hospitalization ([facility] 9/11) from a month prior. Effusion is stable.     - Patient refused thoracentesis, as effusion less likely due to an infectious etiology and therefore decision was made by patient health proxy (Christydarrin Lion andreea gilda) that risks out way benefits since patients would not be interested in pursuing any additional treatments if effusion was malignant in nature.     -pt to followup opt for Xray chest and/or US chest         #VIRY    - Resolved        # Hyperkalemia:     - patient required multiple hyperkalemia cocktails    - resolved and should follow up with PCP for further management             New medications:     Labs to be followed outpatient: Follow up a BMP with PCP    Exam to be followed outpatient: Follow up with a Chest xray with Dr. Acuña to monitor the effusion

## 2019-10-17 NOTE — PROGRESS NOTE ADULT - SUBJECTIVE AND OBJECTIVE BOX
CC/ HPI Patient is a 89 year old female presents with dyspnea, cough and wheezing, admitted for viral pneumonia with moderate to large partially loculated left pleural effusion initially discovered on CT chest following evaluation of head trauma Sept 2019, seen this morning resting in bed without acute complaint.       PAST MEDICAL & SURGICAL HISTORY:  Peripheral vascular insufficiency  CVA (cerebrovascular accident)  HTN (hypertension)    SOCHX:  - tobacco,  -  alcohol    FMHX: FA/MO  - contributory     ROS reviewed below with positive findings marked (+) :  GEN:  fever, chills ENT: tracheostomy,   epistaxis,  sinusitis COR: CAD, CHF,  HTN, dysrhythmia PUL: COPD, ILD, asthma, pneumonia GI: PEG, dysphagia, hemorrhage, other ALEX: kidney disease, electrolyte disorder HEM:  anemia, thrombus, coagulopathy, cancer ENDO:  thyroid disease, diabetes mellitus CNS:  dementia, stroke, seizure, PSY:  depression, anxiety, other      MEDICATIONS  (STANDING):  ALBUTerol/ipratropium for Nebulization 3 milliLiter(s) Nebulizer every 6 hours  allopurinol 50 milliGRAM(s) Oral daily  aspirin  chewable 81 milliGRAM(s) Oral daily  atorvastatin 20 milliGRAM(s) Oral at bedtime  enoxaparin Injectable 30 milliGRAM(s) SubCutaneous every 24 hours  ferrous    sulfate 325 milliGRAM(s) Oral daily  folic acid 1 milliGRAM(s) Oral daily  influenza   Vaccine 0.5 milliLiter(s) IntraMuscular once  polyethylene glycol 3350 17 Gram(s) Oral daily  senna 2 Tablet(s) Oral at bedtime        Vital Signs Last 24 Hrs  T(C): 36.7 (17 Oct 2019 09:19), Max: 36.8 (16 Oct 2019 21:29)  T(F): 98 (17 Oct 2019 09:19), Max: 98.2 (16 Oct 2019 21:29)  HR: 76 (17 Oct 2019 09:19) (75 - 88)  BP: 117/66 (17 Oct 2019 09:19) (108/66 - 117/66)  BP(mean): --  RR: 17 (17 Oct 2019 09:19) (16 - 18)  SpO2: 97% (17 Oct 2019 09:19) (94% - 97%)    GENERAL:         comfortable,  - distress.  HEENT:            - trauma,  - icterus,  - injection,  - nasal discharge.  NECK:              - jugular venous distention, - thyromegaly.  LYMPH:           - lymphadenopathy, - masses.  RESP:              + rhonchi, + wheezes,   - rales.   COR:                S1S2   - gallops,  - rubs.  ABD:                bowel sounds,   soft, - tender, - distended.  EXT/MSC:         - cyanosis,  - clubbing,  edema.    NEURO:             alert,   responds to stimuli.                            8.0    8.50  )-----------( 315      ( 17 Oct 2019 17:16 )             27.1     10-17    140  |  111<H>  |  71<H>  ----------------------------<  108<H>  5.7<H>   |  18<L>  |  1.69<H>          CXR (10/16/19)  Left consolidation with pleural effusion, right base opacity.        ASSESSMENT/PLAN    1)  Pneumonia  2)  Left loculated pleural effusion  3)  Bronchospasm    Oxygen as needed for a satisfactory SpO2  Recommend IR thoracentesis for diagnosis  Bedside spirometry  Bronchodilators:  Atrovent/ albuterol q 4 – 6 hours as needed  Corticosteroids: status post solumedrol, consider prednisone  ID/Antibiotics: status post Zithromax/ceftriaxone  Cardiac/HTN: BP satisfactory  GI: Rx/ prophylaxis c PPI/H2B  Heme: Rx/VT prophylaxis  Discussed at length with the medical team

## 2019-10-18 VITALS
OXYGEN SATURATION: 98 % | DIASTOLIC BLOOD PRESSURE: 56 MMHG | TEMPERATURE: 99 F | HEART RATE: 75 BPM | SYSTOLIC BLOOD PRESSURE: 105 MMHG | RESPIRATION RATE: 18 BRPM

## 2019-10-18 LAB
ANION GAP SERPL CALC-SCNC: 10 MMOL/L — SIGNIFICANT CHANGE UP (ref 5–17)
BUN SERPL-MCNC: 72 MG/DL — HIGH (ref 7–23)
CALCIUM SERPL-MCNC: 9.1 MG/DL — SIGNIFICANT CHANGE UP (ref 8.4–10.5)
CHLORIDE SERPL-SCNC: 110 MMOL/L — HIGH (ref 96–108)
CO2 SERPL-SCNC: 18 MMOL/L — LOW (ref 22–31)
CREAT SERPL-MCNC: 1.57 MG/DL — HIGH (ref 0.5–1.3)
GLUCOSE SERPL-MCNC: 56 MG/DL — LOW (ref 70–99)
HCT VFR BLD CALC: 25.9 % — LOW (ref 34.5–45)
HGB BLD-MCNC: 7.5 G/DL — LOW (ref 11.5–15.5)
MAGNESIUM SERPL-MCNC: 2 MG/DL — SIGNIFICANT CHANGE UP (ref 1.6–2.6)
MCHC RBC-ENTMCNC: 29 GM/DL — LOW (ref 32–36)
MCHC RBC-ENTMCNC: 29.1 PG — SIGNIFICANT CHANGE UP (ref 27–34)
MCV RBC AUTO: 100.4 FL — HIGH (ref 80–100)
NRBC # BLD: 0 /100 WBCS — SIGNIFICANT CHANGE UP (ref 0–0)
PLATELET # BLD AUTO: 305 K/UL — SIGNIFICANT CHANGE UP (ref 150–400)
POTASSIUM SERPL-MCNC: 5.2 MMOL/L — SIGNIFICANT CHANGE UP (ref 3.5–5.3)
POTASSIUM SERPL-SCNC: 5.2 MMOL/L — SIGNIFICANT CHANGE UP (ref 3.5–5.3)
RBC # BLD: 2.58 M/UL — LOW (ref 3.8–5.2)
RBC # FLD: 14.1 % — SIGNIFICANT CHANGE UP (ref 10.3–14.5)
SODIUM SERPL-SCNC: 138 MMOL/L — SIGNIFICANT CHANGE UP (ref 135–145)
WBC # BLD: 8.38 K/UL — SIGNIFICANT CHANGE UP (ref 3.8–10.5)
WBC # FLD AUTO: 8.38 K/UL — SIGNIFICANT CHANGE UP (ref 3.8–10.5)

## 2019-10-18 PROCEDURE — 80053 COMPREHEN METABOLIC PANEL: CPT

## 2019-10-18 PROCEDURE — 71045 X-RAY EXAM CHEST 1 VIEW: CPT

## 2019-10-18 PROCEDURE — 84100 ASSAY OF PHOSPHORUS: CPT

## 2019-10-18 PROCEDURE — 82803 BLOOD GASES ANY COMBINATION: CPT

## 2019-10-18 PROCEDURE — 96374 THER/PROPH/DIAG INJ IV PUSH: CPT

## 2019-10-18 PROCEDURE — 99239 HOSP IP/OBS DSCHRG MGMT >30: CPT | Mod: GC

## 2019-10-18 PROCEDURE — 85025 COMPLETE CBC W/AUTO DIFF WBC: CPT

## 2019-10-18 PROCEDURE — 83605 ASSAY OF LACTIC ACID: CPT

## 2019-10-18 PROCEDURE — 82553 CREATINE MB FRACTION: CPT

## 2019-10-18 PROCEDURE — 83735 ASSAY OF MAGNESIUM: CPT

## 2019-10-18 PROCEDURE — 80048 BASIC METABOLIC PNL TOTAL CA: CPT

## 2019-10-18 PROCEDURE — 84484 ASSAY OF TROPONIN QUANT: CPT

## 2019-10-18 PROCEDURE — 87581 M.PNEUMON DNA AMP PROBE: CPT

## 2019-10-18 PROCEDURE — 82550 ASSAY OF CK (CPK): CPT

## 2019-10-18 PROCEDURE — 87798 DETECT AGENT NOS DNA AMP: CPT

## 2019-10-18 PROCEDURE — 96375 TX/PRO/DX INJ NEW DRUG ADDON: CPT

## 2019-10-18 PROCEDURE — 87040 BLOOD CULTURE FOR BACTERIA: CPT

## 2019-10-18 PROCEDURE — 87486 CHLMYD PNEUM DNA AMP PROBE: CPT

## 2019-10-18 PROCEDURE — 36415 COLL VENOUS BLD VENIPUNCTURE: CPT

## 2019-10-18 PROCEDURE — 73562 X-RAY EXAM OF KNEE 3: CPT

## 2019-10-18 PROCEDURE — 83930 ASSAY OF BLOOD OSMOLALITY: CPT

## 2019-10-18 PROCEDURE — 82962 GLUCOSE BLOOD TEST: CPT

## 2019-10-18 PROCEDURE — 93306 TTE W/DOPPLER COMPLETE: CPT

## 2019-10-18 PROCEDURE — 99285 EMERGENCY DEPT VISIT HI MDM: CPT | Mod: 25

## 2019-10-18 PROCEDURE — 93005 ELECTROCARDIOGRAM TRACING: CPT

## 2019-10-18 PROCEDURE — 71045 X-RAY EXAM CHEST 1 VIEW: CPT | Mod: 26

## 2019-10-18 PROCEDURE — 85610 PROTHROMBIN TIME: CPT

## 2019-10-18 PROCEDURE — 85027 COMPLETE CBC AUTOMATED: CPT

## 2019-10-18 PROCEDURE — 83880 ASSAY OF NATRIURETIC PEPTIDE: CPT

## 2019-10-18 PROCEDURE — 84145 PROCALCITONIN (PCT): CPT

## 2019-10-18 PROCEDURE — 94640 AIRWAY INHALATION TREATMENT: CPT

## 2019-10-18 PROCEDURE — 87633 RESP VIRUS 12-25 TARGETS: CPT

## 2019-10-18 RX ORDER — DEXTROSE 50 % IN WATER 50 %
50 SYRINGE (ML) INTRAVENOUS ONCE
Refills: 0 | Status: COMPLETED | OUTPATIENT
Start: 2019-10-18 | End: 2019-10-18

## 2019-10-18 RX ORDER — ASPIRIN/CALCIUM CARB/MAGNESIUM 324 MG
1 TABLET ORAL
Qty: 0 | Refills: 0 | DISCHARGE
Start: 2019-10-18

## 2019-10-18 RX ORDER — INSULIN HUMAN 100 [IU]/ML
10 INJECTION, SOLUTION SUBCUTANEOUS ONCE
Refills: 0 | Status: COMPLETED | OUTPATIENT
Start: 2019-10-17 | End: 2019-10-18

## 2019-10-18 RX ADMIN — INSULIN HUMAN 10 UNIT(S): 100 INJECTION, SOLUTION SUBCUTANEOUS at 00:43

## 2019-10-18 RX ADMIN — ENOXAPARIN SODIUM 30 MILLIGRAM(S): 100 INJECTION SUBCUTANEOUS at 12:24

## 2019-10-18 RX ADMIN — Medication 325 MILLIGRAM(S): at 12:22

## 2019-10-18 RX ADMIN — Medication 3 MILLILITER(S): at 05:09

## 2019-10-18 RX ADMIN — Medication 3 MILLILITER(S): at 15:24

## 2019-10-18 RX ADMIN — Medication 50 MILLILITER(S): at 00:43

## 2019-10-18 RX ADMIN — Medication 50 MILLIGRAM(S): at 12:22

## 2019-10-18 RX ADMIN — Medication 81 MILLIGRAM(S): at 12:22

## 2019-10-18 RX ADMIN — Medication 1 MILLIGRAM(S): at 12:22

## 2019-10-18 RX ADMIN — Medication 3 MILLILITER(S): at 11:08

## 2019-10-18 NOTE — DISCHARGE NOTE NURSING/CASE MANAGEMENT/SOCIAL WORK - NSDCPEPTSTRK_GEN_ALL_CORE
Stroke education booklet/Stroke warning signs and symptoms/Signs and symptoms of stroke/Call 911 for stroke/Need for follow up after discharge/Prescribed medications/Risk factors for stroke/Stroke support groups for patients, families, and friends

## 2019-10-18 NOTE — PROGRESS NOTE ADULT - SUBJECTIVE AND OBJECTIVE BOX
CC/ HPI Patient is a 89 year old female presents with dyspnea, cough and wheezing, admitted for viral pneumonia with moderate to large partially loculated left pleural effusion initially discovered on CT chest following evaluation of head trauma Sept 2019, seen this morning resting in bed without acute complaint.       PAST MEDICAL & SURGICAL HISTORY:  Peripheral vascular insufficiency  CVA (cerebrovascular accident)  HTN (hypertension)    SOCHX:  - tobacco,  -  alcohol    FMHX: FA/MO  - contributory     ROS reviewed below with positive findings marked (+) :  GEN:  fever, chills ENT: tracheostomy,   epistaxis,  sinusitis COR: CAD, CHF,  HTN, dysrhythmia PUL: COPD, ILD, asthma, pneumonia GI: PEG, dysphagia, hemorrhage, other ALEX: kidney disease, electrolyte disorder HEM:  anemia, thrombus, coagulopathy, cancer ENDO:  thyroid disease, diabetes mellitus CNS:  dementia, stroke, seizure, PSY:  depression, anxiety, other      MEDICATIONS  (STANDING):  ALBUTerol/ipratropium for Nebulization 3 milliLiter(s) Nebulizer every 6 hours  allopurinol 50 milliGRAM(s) Oral daily  aspirin  chewable 81 milliGRAM(s) Oral daily  atorvastatin 20 milliGRAM(s) Oral at bedtime  enoxaparin Injectable 30 milliGRAM(s) SubCutaneous every 24 hours  ferrous    sulfate 325 milliGRAM(s) Oral daily  folic acid 1 milliGRAM(s) Oral daily  influenza   Vaccine 0.5 milliLiter(s) IntraMuscular once  polyethylene glycol 3350 17 Gram(s) Oral daily  senna 2 Tablet(s) Oral at bedtime    MEDICATIONS  (PRN):      Vital Signs Last 24 Hrs  T(C): 37.2 (18 Oct 2019 05:03), Max: 37.2 (18 Oct 2019 05:03)  T(F): 98.9 (18 Oct 2019 05:03), Max: 98.9 (18 Oct 2019 05:03)  HR: 75 (18 Oct 2019 05:03) (75 - 82)  BP: 105/56 (18 Oct 2019 05:03) (105/56 - 114/45)  RR: 18 (18 Oct 2019 05:03) (16 - 18)  SpO2: 98% (18 Oct 2019 05:03) (96% - 98%)    GENERAL:         comfortable,  - distress.  HEENT:            - trauma,  - icterus,  - injection,  - nasal discharge.  NECK:              - jugular venous distention, - thyromegaly.  LYMPH:           - lymphadenopathy, - masses.  RESP:              + wheezes,   - rales,   + rhonchi.   COR:                S1S2   - gallops,  - rubs.  ABD:                bowel sounds,   soft, - tender, - distended  EXT/MSC:         - cyanosis,  - clubbing,  edema.    NEURO:            + alert,   responds to stimuli.                            7.5    8.38  )-----------( 305      ( 18 Oct 2019 07:56 )             25.9     10-18    138  |  110<H>  |  72<H>  ----------------------------<  56<L>  5.2   |  18<L>  |  1.57<H>    Ca    9.1      18 Oct 2019 07:56  Phos  4.2     10-16  Mg     2.0     10-18          CXR (10/16/19)  Left consolidation with pleural effusion, right base opacity.        ASSESSMENT/PLAN    1)  Rule out pneumonia, atelectasis  2)  Left loculated pleural effusion  3)  Bronchospasm improved    Oxygen as needed for a satisfactory SpO2  Recommend IR thoracentesis for diagnosis if consistent with the goals of care  Bronchodilators:  Atrovent/ albuterol q 4 – 6 hours as needed  Corticosteroids: status post solumedrol  ID/Antibiotics: status post Zithromax/ceftriaxone  Rx/VT prophylaxis  Discussed at length with the medical team  Discussed with Dr. Alonso who will cover  Oct 19-20.

## 2019-10-18 NOTE — DISCHARGE NOTE NURSING/CASE MANAGEMENT/SOCIAL WORK - PATIENT PORTAL LINK FT
You can access the FollowMyHealth Patient Portal offered by [facility] by registering at the following website: [email or web address]. By joining [employer]’s FollowMyHealth portal, you will also be able to view your health information using other applications (apps) compatible with our system.

## 2019-10-21 LAB
CULTURE RESULTS: SIGNIFICANT CHANGE UP
CULTURE RESULTS: SIGNIFICANT CHANGE UP
SPECIMEN SOURCE: SIGNIFICANT CHANGE UP
SPECIMEN SOURCE: SIGNIFICANT CHANGE UP

## 2019-10-22 DIAGNOSIS — I10 ESSENTIAL (PRIMARY) HYPERTENSION: ICD-10-CM

## 2019-10-22 DIAGNOSIS — J12.89 OTHER VIRAL PNEUMONIA: ICD-10-CM

## 2019-10-22 DIAGNOSIS — I73.9 PERIPHERAL VASCULAR DISEASE, UNSPECIFIED: ICD-10-CM

## 2019-10-22 DIAGNOSIS — M10.9 GOUT, UNSPECIFIED: ICD-10-CM

## 2019-10-22 DIAGNOSIS — Z53.20 PROCEDURE AND TREATMENT NOT CARRIED OUT BECAUSE OF PATIENT'S DECISION FOR UNSPECIFIED REASONS: ICD-10-CM

## 2019-10-22 DIAGNOSIS — Z66 DO NOT RESUSCITATE: ICD-10-CM

## 2019-10-22 DIAGNOSIS — D64.9 ANEMIA, UNSPECIFIED: ICD-10-CM

## 2019-10-22 DIAGNOSIS — E87.5 HYPERKALEMIA: ICD-10-CM

## 2019-10-22 DIAGNOSIS — I69.359 HEMIPLEGIA AND HEMIPARESIS FOLLOWING CEREBRAL INFARCTION AFFECTING UNSPECIFIED SIDE: ICD-10-CM

## 2019-10-22 DIAGNOSIS — J90 PLEURAL EFFUSION, NOT ELSEWHERE CLASSIFIED: ICD-10-CM

## 2019-10-22 DIAGNOSIS — R06.02 SHORTNESS OF BREATH: ICD-10-CM

## 2019-10-22 DIAGNOSIS — Z99.3 DEPENDENCE ON WHEELCHAIR: ICD-10-CM

## 2019-10-22 DIAGNOSIS — N17.9 ACUTE KIDNEY FAILURE, UNSPECIFIED: ICD-10-CM

## 2021-01-15 NOTE — PATIENT PROFILE ADULT - FUNCTIONAL SCREEN CURRENT LEVEL: DRESSING, MLM
Quality 110: Preventive Care And Screening: Influenza Immunization: Influenza Immunization Administered during Influenza season
Detail Level: Detailed
Quality 111:Pneumonia Vaccination Status For Older Adults: Pneumococcal Vaccination Previously Received
Quality 130: Documentation Of Current Medications In The Medical Record: Current Medications Documented
2 = assistive person

## 2021-06-07 NOTE — GOALS OF CARE CONVERSATION - ADVANCED CARE PLANNING - DOES PATIENT HAVE A SURROGATE
Pt given instructions for follow-up and discharge. Pt verbalizes understanding. Pt is A&O x4, PWD, eupneic, and ambulatory with steady, even gait upon discharge.         Bertram Bernheim, RN  06/07/21 1126
Yes

## 2022-10-26 NOTE — PATIENT PROFILE ADULT - NSPROMUTANXFEARADDRESSFT_GEN_A_NUR
Patient Specific Counseling (Will Not Stick From Patient To Patient): Nice improvement - add Elidel for R inguinal fold.  Advised pt to be patient and should clear in next few weeks
Detail Level: Simple
none

## 2025-05-12 NOTE — H&P ADULT - PROBLEM SELECTOR PLAN 5
Her/She History of gout on allopurinol 50mg PO daily  - Not currently in gout flare  - Continue home allopurinol History of gout on allopurinol 50mg PO daily. Not currently in gout flare.  - Continue home allopurinol History of gout on allopurinol 50mg PO daily. Currently with swollen left knee, warm. Although not traditional area for true gout, may be due to gout flare.   - Hold allopurinol for now in setting of new onset knee pain, possible gout flare  - consult orthopaedics service for evaluate, may require tap  - xray left knee for r/o osseous abnormality